# Patient Record
Sex: FEMALE | Race: WHITE | URBAN - METROPOLITAN AREA
[De-identification: names, ages, dates, MRNs, and addresses within clinical notes are randomized per-mention and may not be internally consistent; named-entity substitution may affect disease eponyms.]

---

## 2022-08-24 ENCOUNTER — OFFICE VISIT (OUTPATIENT)
Dept: URBAN - METROPOLITAN AREA CLINIC 3 | Facility: CLINIC | Age: 47
End: 2022-08-24
Payer: COMMERCIAL

## 2022-08-24 DIAGNOSIS — H43.312 VITREOUS MEMBRANES AND STRANDS, LEFT EYE: ICD-10-CM

## 2022-08-24 DIAGNOSIS — H11.441 CONJUNCTIVAL CYSTS, RIGHT EYE: ICD-10-CM

## 2022-08-24 DIAGNOSIS — H16.042 MARGINAL CORNEAL ULCER, LEFT EYE: Primary | ICD-10-CM

## 2022-08-24 PROCEDURE — 92002 INTRM OPH EXAM NEW PATIENT: CPT | Performed by: OPHTHALMOLOGY

## 2022-08-24 RX ORDER — METHOTREXATE SODIUM 2.5 MG/1
2.5 MG TABLET ORAL
Qty: 48 | Refills: 3 | Status: ACTIVE
Start: 2022-08-24

## 2022-08-24 ASSESSMENT — INTRAOCULAR PRESSURE
OS: 15
OD: 19

## 2022-08-24 NOTE — IMPRESSION/PLAN
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 Plan: No CL use. Patient had a similar episode of a white corneal opacity in 2016 treated by an Ophthalmologist in [place], [place] with Polytrim. POH significant for Wegener's Granulomatosis, first symptoms noticed on the nose with chronic sinusistis that progressed to bleeding. Was under the care of Dr. Mavis Spain ENT at [address] [place] [address],[address] [id number] in [place] who started Methotrexatre2.5mg 6 pills Qweek. Patient ran ouit and needs refills. Start Maxitrol gtt QID for 1 week and BID for 1 week. Gave refills for methotrexate.  RTC in 3 weeks for baseline dilated exam.

## 2022-08-24 NOTE — IMPRESSION/PLAN
Impression: Conjunctival cysts, right eye: H11.441.  Plan: Appears to be a bening tear cyst. Monitor

## 2022-08-24 NOTE — IMPRESSION/PLAN
Impression: Vitreous membranes and strands, left eye: H43.312. Plan: Vitreous membranes and strands- reviewed the signs and symptoms of possible retinal detachment (flashes, floaters, change in peripheral vision, etc). Advised to contact us immediately for any of these or other new symptoms.

## 2022-09-14 ENCOUNTER — OFFICE VISIT (OUTPATIENT)
Dept: URBAN - METROPOLITAN AREA CLINIC 3 | Facility: CLINIC | Age: 47
End: 2022-09-14
Payer: COMMERCIAL

## 2022-09-14 DIAGNOSIS — H11.441 CONJUNCTIVAL CYSTS, RIGHT EYE: ICD-10-CM

## 2022-09-14 DIAGNOSIS — H57.11 OCULAR PAIN, RIGHT EYE: ICD-10-CM

## 2022-09-14 DIAGNOSIS — H16.042 MARGINAL CORNEAL ULCER, LEFT EYE: Primary | ICD-10-CM

## 2022-09-14 DIAGNOSIS — H57.12 OCULAR PAIN, LEFT EYE: ICD-10-CM

## 2022-09-14 DIAGNOSIS — H43.313 VITREOUS MEMBRANES AND STRANDS, BILATERAL: ICD-10-CM

## 2022-09-14 PROCEDURE — 92014 COMPRE OPH EXAM EST PT 1/>: CPT | Performed by: OPHTHALMOLOGY

## 2022-09-14 RX ORDER — KETOROLAC TROMETHAMINE 5 MG/ML
0.5 % SOLUTION OPHTHALMIC
Qty: 5 | Refills: 0 | Status: ACTIVE
Start: 2022-09-14

## 2022-09-14 ASSESSMENT — INTRAOCULAR PRESSURE
OD: 17
OS: 17

## 2022-09-14 NOTE — IMPRESSION/PLAN
Impression: Ocular pain, right eye: H57.11. Plan: C/o pain in eyes. 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 Explained to patient that there are many factors involved in ocular pain. Start Ketorolac 0.5% OU as needed for pain.

## 2022-09-14 NOTE — IMPRESSION/PLAN
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 Plan: Resolving well. Stop Maxitrol gtt and save for future use.

## 2023-09-21 ENCOUNTER — OFFICE VISIT (OUTPATIENT)
Dept: URBAN - METROPOLITAN AREA CLINIC 3 | Facility: CLINIC | Age: 48
End: 2023-09-21
Payer: COMMERCIAL

## 2023-09-21 DIAGNOSIS — H57.12 OCULAR PAIN, LEFT EYE: Primary | ICD-10-CM

## 2023-09-21 PROCEDURE — 99212 OFFICE O/P EST SF 10 MIN: CPT | Performed by: OPTOMETRIST

## 2023-09-21 RX ORDER — VALACYCLOVIR HYDROCHLORIDE 1 G/1
TABLET, FILM COATED ORAL
Qty: 30 | Refills: 0 | Status: INACTIVE
Start: 2023-09-21 | End: 2023-09-30

## 2023-09-21 RX ORDER — PREDNISOLONE ACETATE 10 MG/ML
1 % SUSPENSION/ DROPS OPHTHALMIC
Qty: 10 | Refills: 0 | Status: INACTIVE
Start: 2023-09-21 | End: 2023-10-02

## 2023-09-21 ASSESSMENT — INTRAOCULAR PRESSURE
OS: 21
OD: 21

## 2023-10-02 ENCOUNTER — OFFICE VISIT (OUTPATIENT)
Dept: URBAN - METROPOLITAN AREA CLINIC 3 | Facility: CLINIC | Age: 48
End: 2023-10-02
Payer: COMMERCIAL

## 2023-10-02 DIAGNOSIS — H43.313 VITREOUS MEMBRANES AND STRANDS, BILATERAL: Primary | ICD-10-CM

## 2023-10-02 DIAGNOSIS — H57.12 OCULAR PAIN, LEFT EYE: ICD-10-CM

## 2023-10-02 DIAGNOSIS — H11.441 CONJUNCTIVAL CYSTS, RIGHT EYE: ICD-10-CM

## 2023-10-02 PROCEDURE — 99214 OFFICE O/P EST MOD 30 MIN: CPT | Performed by: OPTOMETRIST

## 2023-10-02 ASSESSMENT — INTRAOCULAR PRESSURE
OS: 19
OD: 20